# Patient Record
Sex: FEMALE | Race: WHITE | NOT HISPANIC OR LATINO | Employment: OTHER | URBAN - METROPOLITAN AREA
[De-identification: names, ages, dates, MRNs, and addresses within clinical notes are randomized per-mention and may not be internally consistent; named-entity substitution may affect disease eponyms.]

---

## 2017-04-11 ENCOUNTER — TELEPHONE (OUTPATIENT)
Dept: RHEUMATOLOGY | Facility: CLINIC | Age: 77
End: 2017-04-11

## 2017-04-11 RX ORDER — ETODOLAC 400 MG/1
400 TABLET, FILM COATED ORAL 2 TIMES DAILY
Qty: 60 TABLET | Refills: 4 | Status: SHIPPED | OUTPATIENT
Start: 2017-04-11 | End: 2017-05-11

## 2017-04-11 NOTE — TELEPHONE ENCOUNTER
----- Message from Aleja Rodrigues MA sent at 4/11/2017 10:41 AM CDT -----  Contact: self: [phone]  Pt states the medication is not working, she would like something else called in  ----- Message -----     From: Trudi Schafer     Sent: 4/11/2017   7:57 AM       To: [facility]    Pt called and would like to speak to nurse in reference to her upcoming appt    Pt can be reached at [phone]    Thank you